# Patient Record
Sex: MALE | Race: WHITE | NOT HISPANIC OR LATINO | Employment: STUDENT | URBAN - METROPOLITAN AREA
[De-identification: names, ages, dates, MRNs, and addresses within clinical notes are randomized per-mention and may not be internally consistent; named-entity substitution may affect disease eponyms.]

---
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
## 2025-01-07 NOTE — TELEPHONE ENCOUNTER
"Spoke with the pt in detail regarding concerns about forgetting thing, sleep, dry lips, "a lot of saliva in the back of my throat", intermittent headaches, ear popping/pain behind ear and concerns about brain tumor. Patient reports he was given antibiotics on 12/27/2024 for a sinus infection, but he reports "I haven't been taking the medication like I should."    Advised the "brain fog", post-nasal drainage, headaches, ear issue, and fatigue can be related to continued sinus infection if antibiotics were not completed. Patient advised that the concerns of a brain tumor came about after someone (acquaintance) mentioned his symptoms could be related. Mom requested a CT or x-ray of the pt's head to reassure him there is no tumor. Advised message regarding the conversation has been sent on to Dr. Carpenter to review prior to appt tomorrow.   "

## 2025-01-08 ENCOUNTER — TELEPHONE (OUTPATIENT)
Dept: PEDIATRICS | Facility: CLINIC | Age: 20
End: 2025-01-08

## 2025-01-08 ENCOUNTER — OFFICE VISIT (OUTPATIENT)
Dept: PEDIATRICS | Facility: CLINIC | Age: 20
End: 2025-01-08
Payer: MEDICAID

## 2025-01-08 VITALS
WEIGHT: 260.69 LBS | DIASTOLIC BLOOD PRESSURE: 84 MMHG | SYSTOLIC BLOOD PRESSURE: 127 MMHG | TEMPERATURE: 98 F | BODY MASS INDEX: 36.36 KG/M2 | HEART RATE: 78 BPM | RESPIRATION RATE: 18 BRPM

## 2025-01-08 DIAGNOSIS — R42 EPISODIC LIGHTHEADEDNESS: ICD-10-CM

## 2025-01-08 DIAGNOSIS — F41.9 ANXIETY: ICD-10-CM

## 2025-01-08 DIAGNOSIS — R51.9 NONINTRACTABLE HEADACHE, UNSPECIFIED CHRONICITY PATTERN, UNSPECIFIED HEADACHE TYPE: ICD-10-CM

## 2025-01-08 DIAGNOSIS — H73.893 RETRACTED TYMPANIC MEMBRANE, BILATERAL: Primary | ICD-10-CM

## 2025-01-08 PROCEDURE — 99213 OFFICE O/P EST LOW 20 MIN: CPT | Mod: PBBFAC,PN | Performed by: PEDIATRICS

## 2025-01-08 PROCEDURE — 3079F DIAST BP 80-89 MM HG: CPT | Mod: CPTII,,, | Performed by: PEDIATRICS

## 2025-01-08 PROCEDURE — G2211 COMPLEX E/M VISIT ADD ON: HCPCS | Mod: S$PBB,,, | Performed by: PEDIATRICS

## 2025-01-08 PROCEDURE — 3008F BODY MASS INDEX DOCD: CPT | Mod: CPTII,,, | Performed by: PEDIATRICS

## 2025-01-08 PROCEDURE — 99214 OFFICE O/P EST MOD 30 MIN: CPT | Mod: S$PBB,,, | Performed by: PEDIATRICS

## 2025-01-08 PROCEDURE — 99999 PR PBB SHADOW E&M-EST. PATIENT-LVL III: CPT | Mod: PBBFAC,,, | Performed by: PEDIATRICS

## 2025-01-08 PROCEDURE — 3074F SYST BP LT 130 MM HG: CPT | Mod: CPTII,,, | Performed by: PEDIATRICS

## 2025-01-08 PROCEDURE — 1159F MED LIST DOCD IN RCRD: CPT | Mod: CPTII,,, | Performed by: PEDIATRICS

## 2025-01-08 PROCEDURE — 1160F RVW MEDS BY RX/DR IN RCRD: CPT | Mod: CPTII,,, | Performed by: PEDIATRICS

## 2025-01-08 RX ORDER — FLUTICASONE PROPIONATE 50 MCG
1 SPRAY, SUSPENSION (ML) NASAL DAILY
Qty: 9.9 ML | Refills: 0 | Status: SHIPPED | OUTPATIENT
Start: 2025-01-08

## 2025-01-08 NOTE — PROGRESS NOTES
HPI    19 y.o. male here with his girlfriend, who serves as independent historian.    Here to discuss ongoing concerns of lightheadedness and other symptoms. See recent visit notes and phone notes. Zhang is still most concerned about the possibility of brain tumor.    Headache both temples and behind both ears. Mostly in the afternoons. Feels better when he lays down. Dark helps. Some ear pain intermittently. Pulsing sound in his ear yesterday while using the restroom.     Eyes bother him at night - using bright phone in dark room.    Drinking more water, limiting soda. Prior to this had not drank any water in 4-5 months (only other beverages - sodas, energy drinks)    Didn't complete his antibiotic for sinusitis - was taking it daily instead of BID.    Lightheadedness has improved some, but still there. Sleep helps.   GF notes that sleep is disjointed, waking frequently. Snores sometimes, no pauses/gasping. Last night slept 3-7am.     Went to cousin's house over the weekend and had no symptoms.     Psych has not yet increased his zoloft.     Strong family history of migraines.    Review of Systems  as per HPI    /84   Pulse 78   Temp 98.2 °F (36.8 °C) (Other (see comments))   Resp 18   Wt 118.2 kg (260 lb 11.1 oz)   BMI 36.36 kg/m²     Physical Exam  Vitals reviewed.   Constitutional:       General: He is not in acute distress.     Appearance: He is well-developed.   HENT:      Right Ear: Ear canal normal.      Left Ear: Ear canal normal.      Ears:      Comments: TM both clear but retracted. Tender behind and above ears, not mastoid. Does have some skin irritation at crease above auricle     Nose: Nose normal.      Mouth/Throat:      Pharynx: No oropharyngeal exudate.   Eyes:      General:         Right eye: No discharge.         Left eye: No discharge.      Conjunctiva/sclera: Conjunctivae normal.      Pupils: Pupils are equal, round, and reactive to light.   Neck:      Thyroid: No thyromegaly.    Cardiovascular:      Rate and Rhythm: Normal rate and regular rhythm.      Heart sounds: Normal heart sounds. No murmur heard.  Pulmonary:      Effort: Pulmonary effort is normal.      Breath sounds: Normal breath sounds. No wheezing or rales.   Abdominal:      General: Bowel sounds are normal. There is no distension.      Palpations: Abdomen is soft. There is no mass.      Tenderness: There is no abdominal tenderness.      Hernia: There is no hernia in the left inguinal area or right inguinal area.   Musculoskeletal:         General: Normal range of motion.      Cervical back: Normal range of motion and neck supple.   Lymphadenopathy:      Cervical: No cervical adenopathy.   Skin:     General: Skin is warm.      Capillary Refill: Capillary refill takes less than 2 seconds.      Coloration: Skin is not pale.      Findings: No rash.   Neurological:      General: No focal deficit present.      Mental Status: He is alert.      Deep Tendon Reflexes: Reflexes are normal and symmetric.   Psychiatric:         Mood and Affect: Mood normal.         Zhang was seen today for headache, light headness, nasal congestion and anxiety.    Diagnoses and all orders for this visit:    Retracted tympanic membrane, bilateral  -     fluticasone propionate (FLONASE) 50 mcg/actuation nasal spray; 1 spray (50 mcg total) by Each Nostril route once daily.    Nonintractable headache, unspecified chronicity pattern, unspecified headache type  -     Cancel: Ambulatory referral/consult to Neurology; Future  -     Ambulatory referral/consult to Neurology; Future    Episodic lightheadedness  -     Cancel: Ambulatory referral/consult to Neurology; Future  -     Ambulatory referral/consult to Neurology; Future    Anxiety       - Flonase  - Continued fluids push  - Refer to neurology for another opinion - possibly migraine variant in addition to lifestyle factors (sleep, hydration) causing symptoms     Roxie Carpenter MD

## 2025-01-08 NOTE — TELEPHONE ENCOUNTER
----- Message from Swathi sent at 1/8/2025  3:22 PM CST -----  Contact: Patient  Type:  Needs Medical Advice    Who Called: Patient      Would the patient rather a call back or a response via [facility]? Call      Best Call Back Number: [phone]    Additional Information: [facility] appt to be able to accept new medicaid patients but the agent was told that the referral would need to be changed to [facility] since right now it shows as [facility].

## 2025-01-10 ENCOUNTER — PATIENT MESSAGE (OUTPATIENT)
Dept: PEDIATRICS | Facility: CLINIC | Age: 20
End: 2025-01-10
Payer: MEDICAID

## 2025-01-10 RX ORDER — ONDANSETRON HYDROCHLORIDE 8 MG/1
8 TABLET, FILM COATED ORAL EVERY 8 HOURS PRN
Qty: 15 TABLET | Refills: 0 | Status: SHIPPED | OUTPATIENT
Start: 2025-01-10

## 2025-01-11 ENCOUNTER — PATIENT MESSAGE (OUTPATIENT)
Dept: PEDIATRICS | Facility: CLINIC | Age: 20
End: 2025-01-11
Payer: MEDICAID

## 2025-01-13 ENCOUNTER — TELEPHONE (OUTPATIENT)
Dept: NEUROLOGY | Facility: CLINIC | Age: 20
End: 2025-01-13
Payer: MEDICAID

## 2025-01-13 ENCOUNTER — PATIENT MESSAGE (OUTPATIENT)
Dept: PEDIATRICS | Facility: CLINIC | Age: 20
End: 2025-01-13
Payer: MEDICAID

## 2025-01-13 DIAGNOSIS — R42 LIGHTHEADEDNESS: Primary | ICD-10-CM

## 2025-01-13 NOTE — TELEPHONE ENCOUNTER
----- Message from Syeda sent at 1/13/2025  2:42 PM CST -----  Regarding: Sooner Appointment Reschedule Request  Contact: mom at [phone]  Type:  Sooner Appointment Reschedule Request    Name of Caller:  mom at [phone]    Symptoms:  dizziness  Additional Information:  needing reschedule with Ananya Seals since ER visit. Please call and advise. Thank you

## 2025-01-13 NOTE — TELEPHONE ENCOUNTER
Signed. I think they are referring the the [facility] cardiology clinic.  ER also made referral.

## 2025-01-13 NOTE — TELEPHONE ENCOUNTER
----- Message from [place]oz[place] sent at 1/13/2025 11:22 AM CST -----  Contact: Jerrica [phone]  Type: Needs Medical Advice  Who Called:  Pts Mom Jerrica     Best Call Back Number: [phone]    Additional Information:     Pts mother calling for a sooner NP appt. She stated pt had went to the ED recently and was told to call office.

## 2025-01-13 NOTE — TELEPHONE ENCOUNTER
Spoke with patient's mother regarding a sooner appointment. Nothing populated with Dr. Villalta due to insurance. Informed patient that the soonest is with Ananya Seals NP. Patient's mother v/u.

## 2025-01-14 NOTE — TELEPHONE ENCOUNTER
Spoke with the pt via phone call, he advised that he has been experiencing issues with his wisdom teeth. Gum inflation, sore/ulcerated (?) with possible drainage, pain when chewing, and issues with food getting stuck in the area. Patient advised that he researched the issue and saw that his symptoms, including elevated platelets. Pt advised that he has contacted his dentist and will call back if further referrals are needed. Pt plans to keep appt with [facility] tomorrow & future appt with cardiology.

## 2025-01-17 DIAGNOSIS — R42 DIZZINESS AND GIDDINESS: Primary | ICD-10-CM

## 2025-01-24 ENCOUNTER — PATIENT MESSAGE (OUTPATIENT)
Dept: PEDIATRICS | Facility: CLINIC | Age: 20
End: 2025-01-24
Payer: MEDICAID

## 2025-01-24 ENCOUNTER — HOSPITAL ENCOUNTER (OUTPATIENT)
Dept: RADIOLOGY | Facility: HOSPITAL | Age: 20
Discharge: HOME | End: 2025-01-24
Attending: PEDIATRICS
Payer: MEDICAID

## 2025-01-24 DIAGNOSIS — J32.9 CLINICAL SINUSITIS: ICD-10-CM

## 2025-01-24 DIAGNOSIS — R42 DIZZINESS AND GIDDINESS: ICD-10-CM

## 2025-01-24 DIAGNOSIS — R42 EPISODIC LIGHTHEADEDNESS: Primary | ICD-10-CM

## 2025-01-24 DIAGNOSIS — R51.9 NONINTRACTABLE HEADACHE, UNSPECIFIED CHRONICITY PATTERN, UNSPECIFIED HEADACHE TYPE: ICD-10-CM

## 2025-01-24 PROCEDURE — 70450 CT HEAD/BRAIN W/O DYE: CPT | Mod: TC,PO

## 2025-01-24 PROCEDURE — 70450 CT HEAD/BRAIN W/O DYE: CPT | Mod: 26,,, | Performed by: RADIOLOGY

## 2025-01-28 RX ORDER — ONDANSETRON 8 MG/1
TABLET, ORALLY DISINTEGRATING ORAL
COMMUNITY

## 2025-01-28 RX ORDER — AMOXICILLIN 500 MG/1
CAPSULE ORAL
COMMUNITY
Start: 2025-01-14 | End: 2025-01-29

## 2025-01-29 ENCOUNTER — OFFICE VISIT (OUTPATIENT)
Dept: PEDIATRICS | Facility: CLINIC | Age: 20
End: 2025-01-29
Payer: MEDICAID

## 2025-01-29 VITALS
BODY MASS INDEX: 39.36 KG/M2 | TEMPERATURE: 98 F | HEART RATE: 86 BPM | RESPIRATION RATE: 20 BRPM | WEIGHT: 266.56 LBS | SYSTOLIC BLOOD PRESSURE: 130 MMHG | DIASTOLIC BLOOD PRESSURE: 83 MMHG

## 2025-01-29 DIAGNOSIS — J32.9 CLINICAL SINUSITIS: Primary | ICD-10-CM

## 2025-01-29 PROCEDURE — G2211 COMPLEX E/M VISIT ADD ON: HCPCS | Mod: S$PBB,,, | Performed by: PEDIATRICS

## 2025-01-29 PROCEDURE — 99999 PR PBB SHADOW E&M-EST. PATIENT-LVL III: CPT | Mod: PBBFAC,,, | Performed by: PEDIATRICS

## 2025-01-29 PROCEDURE — 3008F BODY MASS INDEX DOCD: CPT | Mod: CPTII,,, | Performed by: PEDIATRICS

## 2025-01-29 PROCEDURE — 3079F DIAST BP 80-89 MM HG: CPT | Mod: CPTII,,, | Performed by: PEDIATRICS

## 2025-01-29 PROCEDURE — 99214 OFFICE O/P EST MOD 30 MIN: CPT | Mod: S$PBB,,, | Performed by: PEDIATRICS

## 2025-01-29 PROCEDURE — 99213 OFFICE O/P EST LOW 20 MIN: CPT | Mod: PBBFAC,PN | Performed by: PEDIATRICS

## 2025-01-29 PROCEDURE — 3075F SYST BP GE 130 - 139MM HG: CPT | Mod: CPTII,,, | Performed by: PEDIATRICS

## 2025-01-29 PROCEDURE — 1160F RVW MEDS BY RX/DR IN RCRD: CPT | Mod: CPTII,,, | Performed by: PEDIATRICS

## 2025-01-29 PROCEDURE — 1159F MED LIST DOCD IN RCRD: CPT | Mod: CPTII,,, | Performed by: PEDIATRICS

## 2025-01-29 RX ORDER — CETIRIZINE HYDROCHLORIDE 10 MG/1
10 TABLET ORAL DAILY
Qty: 30 TABLET | Refills: 2 | Status: SHIPPED | OUTPATIENT
Start: 2025-01-29 | End: 2026-01-29

## 2025-01-29 RX ORDER — AMOXICILLIN AND CLAVULANATE POTASSIUM 875; 125 MG/1; MG/1
1 TABLET, FILM COATED ORAL EVERY 12 HOURS
Qty: 20 TABLET | Refills: 0 | Status: SHIPPED | OUTPATIENT
Start: 2025-01-29

## 2025-01-29 NOTE — PROGRESS NOTES
HPI    19 y.o. male here with Mom, who serves as independent historian.    Follow up lightheadedness and HA. Neurologist felt symptoms were all 2/2 dental issues. No further neuro work up unless symptoms persist after resolution of dental work.   CT head was normal - only ethmoid sinus disease.    Recently took amoxicillin for dental infection - tomorrow is last day. Trying to be more compliant but may still have missed a couple doses.    Overall lightheadedness has been better. Less congested but still present. Still having pressure behind his nose/eyes/ears at times. Frequent feeling of PND. Headaches with deep breath, especially when he stands up. Laying down is better but nighttime in general is worse.    Scheduled to get wisdom teeth out next week but keeps putting it off.    Tried flonase but caused him to spit up a lot of mucus.     Cardiology work up ongoing for the lightheadedness. Had TTE today, getting holter and exercise stress test.    Review of Systems  as per HPI    /83   Pulse 86   Temp 98.3 °F (36.8 °C) (Oral)   Resp 20   Wt 120.9 kg (266 lb 8.6 oz)   BMI 39.36 kg/m²     Physical Exam  Vitals reviewed.   Constitutional:       General: He is not in acute distress.     Appearance: He is well-developed.   HENT:      Right Ear: Tympanic membrane and ear canal normal.      Left Ear: Tympanic membrane and ear canal normal.      Nose: Congestion present.      Mouth/Throat:      Pharynx: No oropharyngeal exudate.   Eyes:      General:         Right eye: No discharge.         Left eye: No discharge.      Conjunctiva/sclera: Conjunctivae normal.      Pupils: Pupils are equal, round, and reactive to light.   Neck:      Thyroid: No thyromegaly.   Cardiovascular:      Rate and Rhythm: Normal rate and regular rhythm.      Heart sounds: Normal heart sounds. No murmur heard.  Pulmonary:      Effort: Pulmonary effort is normal.      Breath sounds: Normal breath sounds. No wheezing or rales.   Abdominal:       General: Bowel sounds are normal. There is no distension.      Palpations: Abdomen is soft. There is no mass.      Tenderness: There is no abdominal tenderness.      Hernia: There is no hernia in the left inguinal area or right inguinal area.   Musculoskeletal:         General: Normal range of motion.      Cervical back: Normal range of motion and neck supple.   Lymphadenopathy:      Cervical: No cervical adenopathy.   Skin:     General: Skin is warm.      Capillary Refill: Capillary refill takes less than 2 seconds.      Coloration: Skin is not pale.      Findings: No rash.   Neurological:      General: No focal deficit present.      Mental Status: He is alert.      Deep Tendon Reflexes: Reflexes are normal and symmetric.   Psychiatric:         Mood and Affect: Mood normal.         Zhang was seen today for head pressure and nasal congestion.    Diagnoses and all orders for this visit:    Clinical sinusitis  -     cetirizine (ZYRTEC) 10 MG tablet; Take 1 tablet (10 mg total) by mouth once daily.  -     amoxicillin-clavulanate 875-125mg (AUGMENTIN) 875-125 mg per tablet; Take 1 tablet by mouth every 12 (twelve) hours.       - Switch amoxicillin to augmentin for broader sinusitis coverage (reassured will still cover dental pathogens, do not need both).  - Start zyrtec  - Consider restarting flonase.   - Continue pushing fluids    - Cardiology working still in progress  - Encouraged him to go through with dental procedure. Although agree okay to wait one more week so as not to be done during period of holter monitor.    Roxie Carpenter MD

## 2025-02-05 RX ORDER — DESVENLAFAXINE SUCCINATE 25 MG/1
1 TABLET, EXTENDED RELEASE ORAL
COMMUNITY
Start: 2025-01-30

## 2025-02-05 RX ORDER — ONDANSETRON HYDROCHLORIDE 8 MG/1
8 TABLET, FILM COATED ORAL EVERY 8 HOURS PRN
Qty: 15 TABLET | Refills: 0 | Status: SHIPPED | OUTPATIENT
Start: 2025-02-05

## 2025-02-24 DIAGNOSIS — F90.9 ATTENTION DEFICIT HYPERACTIVITY DISORDER (ADHD), UNSPECIFIED ADHD TYPE: Primary | ICD-10-CM

## 2025-02-24 DIAGNOSIS — K29.60 REFLUX GASTRITIS: ICD-10-CM

## 2025-02-24 DIAGNOSIS — F41.9 ANXIETY: ICD-10-CM

## 2025-02-24 NOTE — PROGRESS NOTES
Here with siblings for their appointment.   Discussed transition to adult medicine before age 20, but will continue to manage until he can establish care.   Requested referral. Order placed.

## 2025-03-09 DIAGNOSIS — H73.893 RETRACTED TYMPANIC MEMBRANE, BILATERAL: ICD-10-CM

## 2025-03-10 RX ORDER — FLUTICASONE PROPIONATE 50 MCG
SPRAY, SUSPENSION (ML) NASAL
Qty: 16 ML | Refills: 2 | Status: SHIPPED | OUTPATIENT
Start: 2025-03-10